# Patient Record
Sex: FEMALE | Race: WHITE
[De-identification: names, ages, dates, MRNs, and addresses within clinical notes are randomized per-mention and may not be internally consistent; named-entity substitution may affect disease eponyms.]

---

## 2020-06-24 ENCOUNTER — TRANSCRIBE ORDERS (OUTPATIENT)
Dept: OTHER | Age: 30
End: 2020-06-24

## 2020-06-24 DIAGNOSIS — H54.7 VISION LOSS: Primary | ICD-10-CM

## 2020-06-25 ENCOUNTER — TELEPHONE (OUTPATIENT)
Dept: OPHTHALMOLOGY | Facility: CLINIC | Age: 30
End: 2020-06-25

## 2020-06-25 NOTE — TELEPHONE ENCOUNTER
Spoke to pt at 1554  2 months ago left eye while working lost vision  Was seen 3 days later for exam-- no objective findings     Seen Monday and recommended to see ophthalmology         Vision worsening afterwards   Spot in central vision blurred/not able to see    Right eye starting to be affected     Foreign body sensation    Scheduled with Dr. Carrington July 7th     Offered next week and pt unable-- other specialty appt per pt     Note to Dr. Zeb Rashid, RN 4:03 PM 06/25/20          [facility]    Phone Message    May a detailed message be left on voicemail: yes     Reason for Call: Other: Pt is urgently referred to Eye Clinic by Dr Agustín Page at [facility] in [place] for a recent and sudden onset vision loss and possible electrodiagnostic testing if indicated. Referral states pt needs to be seen within 2-3 weeks. Please review and reach out to pt to discuss, thanks!     Action Taken: Message routed to:  [facility] Eye Clinic    Travel Screening: Not Applicable

## 2020-06-25 NOTE — TELEPHONE ENCOUNTER
Thank you for the note. I was able to review the note from the Optometry visit this week. Agree that we should see the patient and thank you for offering next week and agreeing on 7/7/20. We will plan to dilate and do testing in the office. If necessary, we could consider electrophysiology such as ERG, but that can be determined after the initial visit.

## 2020-07-07 ENCOUNTER — OFFICE VISIT (OUTPATIENT)
Dept: OPHTHALMOLOGY | Facility: CLINIC | Age: 30
End: 2020-07-07
Attending: OPHTHALMOLOGY
Payer: COMMERCIAL

## 2020-07-07 DIAGNOSIS — H53.8 BLURRY VISION, BILATERAL: Primary | ICD-10-CM

## 2020-07-07 DIAGNOSIS — H40.052 OCULAR HYPERTENSION, LEFT EYE: ICD-10-CM

## 2020-07-07 PROBLEM — M62.59 ATROPHY OF MUSCLE OF MULTIPLE SITES: Status: ACTIVE | Noted: 2020-06-30

## 2020-07-07 PROBLEM — R79.89 ELEVATED TESTOSTERONE LEVEL IN FEMALE: Status: ACTIVE | Noted: 2018-05-16

## 2020-07-07 PROBLEM — N91.2 AMENORRHEA: Status: ACTIVE | Noted: 2018-05-16

## 2020-07-07 PROBLEM — K58.0 IRRITABLE BOWEL SYNDROME WITH DIARRHEA: Status: ACTIVE | Noted: 2018-04-17

## 2020-07-07 PROBLEM — G62.9 NEUROPATHY: Status: ACTIVE | Noted: 2020-06-30

## 2020-07-07 PROBLEM — R53.1 WEAKNESS: Status: ACTIVE | Noted: 2020-06-30

## 2020-07-07 PROBLEM — K76.0 HEPATIC STEATOSIS: Status: ACTIVE | Noted: 2018-07-03

## 2020-07-07 PROCEDURE — 92134 CPTRZ OPH DX IMG PST SGM RTA: CPT | Mod: ZF | Performed by: OPHTHALMOLOGY

## 2020-07-07 PROCEDURE — 92133 CPTRZD OPH DX IMG PST SGM ON: CPT | Mod: ZF | Performed by: OPHTHALMOLOGY

## 2020-07-07 PROCEDURE — G0463 HOSPITAL OUTPT CLINIC VISIT: HCPCS | Mod: ZF

## 2020-07-07 PROCEDURE — 92250 FUNDUS PHOTOGRAPHY W/I&R: CPT | Mod: 59 | Performed by: OPHTHALMOLOGY

## 2020-07-07 RX ORDER — LEVOTHYROXINE SODIUM 25 UG/1
25 TABLET ORAL
COMMUNITY
Start: 2020-06-08

## 2020-07-07 RX ORDER — GABAPENTIN 100 MG/1
100 CAPSULE ORAL DAILY PRN
COMMUNITY
Start: 2020-07-03

## 2020-07-07 RX ORDER — CALCIUM CARBONATE 500 MG/1
500 TABLET, CHEWABLE ORAL DAILY PRN
COMMUNITY
Start: 2019-09-18

## 2020-07-07 RX ORDER — ALPRAZOLAM 0.5 MG
1 TABLET ORAL 3 TIMES DAILY PRN
COMMUNITY
Start: 2019-02-08

## 2020-07-07 RX ORDER — HYDROXYZINE HYDROCHLORIDE 25 MG/1
25 TABLET, FILM COATED ORAL EVERY 6 HOURS PRN
COMMUNITY
Start: 2020-06-04

## 2020-07-07 RX ORDER — FOLIC ACID 1 MG/1
1 TABLET ORAL DAILY
COMMUNITY
Start: 2020-02-15

## 2020-07-07 RX ORDER — ERGOCALCIFEROL 1.25 MG/1
1 CAPSULE, LIQUID FILLED ORAL DAILY
COMMUNITY
Start: 2019-09-24

## 2020-07-07 ASSESSMENT — REFRACTION_WEARINGRX
OD_AXIS: 088
OS_SPHERE: -7.50
OS_AXIS: 088
OS_SPHERE: -6.00
OS_CYLINDER: +3.50
SPECS_TYPE: SVL
OS_AXIS: 094
OD_AXIS: 085
OD_SPHERE: -6.25
OD_CYLINDER: +4.00
SPECS_TYPE: SVL
OS_CYLINDER: +3.75
OD_CYLINDER: +3.50
OD_SPHERE: -6.75

## 2020-07-07 ASSESSMENT — EXTERNAL EXAM - LEFT EYE: OS_EXAM: NORMAL

## 2020-07-07 ASSESSMENT — CONF VISUAL FIELD
OS_NORMAL: 1
METHOD: COUNTING FINGERS
OD_NORMAL: 1

## 2020-07-07 ASSESSMENT — VISUAL ACUITY
OD_PH_CC: 20/80
OD_CC: 20/100
CORRECTION_TYPE: GLASSES
METHOD: SNELLEN - SINGLE
OS_CC: 20/80

## 2020-07-07 ASSESSMENT — TONOMETRY
IOP_METHOD: ICARE
OS_IOP_MMHG: 25
OD_IOP_MMHG: 20

## 2020-07-07 ASSESSMENT — SLIT LAMP EXAM - LIDS
COMMENTS: NORMAL
COMMENTS: NORMAL

## 2020-07-07 ASSESSMENT — EXTERNAL EXAM - RIGHT EYE: OD_EXAM: NORMAL

## 2020-07-07 ASSESSMENT — CUP TO DISC RATIO
OS_RATIO: 0.4
OD_RATIO: 0.4

## 2020-07-07 NOTE — NURSING NOTE
"Chief Complaints and History of Present Illnesses   Patient presents with     Consult For     Chief Complaint(s) and History of Present Illness(es)     Consult For     Laterality: both eyes    Location: central vision    Quality: blurred vision    Severity: severe    Frequency: constantly    Course: gradually worsening    Associated symptoms: headache, weight loss, photophobia and floaters.  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     Vision \"is much worse\" since RALF 06/22/20.     Floaters are longstanding and unchanging.     C/o increased light sensitivity and of intermittent FB \"elizabet/gritty\" sensation. Denies dry eyes.   Recent 50lb weight loss in past 4mo.    urologist: Imtiaz HAYWARD, has f/u on Tuesday 7/14/20 for f/u     Coty Alvarenga COT 12:38 PM July 7, 2020                      "

## 2020-07-07 NOTE — LETTER
7/7/2020       RE: Kalyn Jauregui  [address] [facility] [address]     Dear Colleague,    Thank you for referring your patient, Kalyn Jauregui, to the EYE CLINIC at [facility]. Please see a copy of my visit note below.    Chief Complaint/Presenting Concern: Retinal     History of Present Illness:   Kalyn Jauregui is a 29 year old patient who presents for evaluation of vision loss over the last few months.     This happened suddenly in April 2020 with vision loss in the left eye while working. Nothing came into the eye. After a few days, the patient went to an outside optometrist. A follow-up visit did not identify any clear cause. Things seem to be worse in each eye with gray spots in the vision, although there are no clear borders/edges. This can sometimes be related to exercise with increased blurriness of the vision. An MRI of the brain was normal and after recent visits to two other eye physicians, this referral was made.    Additional Ocular History: Spectacle wear, no eye injuries or surgies    Relevant Past Medical/Family/Social History:  1. Neuropathy being evaluated including MRI of the neck. MRI of the brain and orbits was performed on 6/10/20 which were both normal (report reviewed)  2. History of bladder/kidney surgery age 8 for reflux    Here with boyfriend. No family history of eye disease.    Relevant Review of Systems: No fevers, rashes, or joint pains. No sweats at night.     Laboratory Testing: July 7, 2020: Normal CRP, ESR.   (Reviewed from 6/4/20): Normal/negative; SUNNY,  Lyme     Current eye related medications: None    Retina/Uveitis Imaging:  OCT Spectralis Macula July 7, 2020  right eye: Normal contour, no fluid, slightly thick choroid but symmetric to left eye. No NFL thickening by raster  left eye:  Normal contour, no fluid. slightly thick choroid but symmetric to right eye. No NFL thickening by raster    Optos Fundus Autofluorescence  Image (FAF) OU (both eyes)  right eye: Normal autofluorescence  left eye:Normal autofluorescence other than focal hypo-AF corresponding to pigment clump inferiorly    OCT Optic Nerve RNFL Spectralis OU (both eyes)  right eye: Avg thickness 91 microns. Borderline thin superonasal  left eye: Avg thickness 98 microns? Blink artifact of thickening temporally    Assessment:    1. Blurry vision, bilateral  Unclear etiology and symmetrically reduced in each eye but exam normal    2. Ocular hypertension, left eye  Mild elevation but healthy appearing optic nerve     Plan/Recommendations:    Discussed findings with patient and boyfriend. The cause of the vision loss is unclear at this time. Optic nerve function (color plates and pupils) normal. The RNFL scan of the optic nerves shows minimal variant in the right eye and artifact left eye. The OCT scans of the retina are normal and healthy in both eyes without abnormalities in the contour of the inner retina, outer retina, retinal pigment epithelium or thickening at the optic nerves.    We discussed that the cause of the vision loss in each eye is unclear. As exam and testing generally normal, we discussed electrophysiology testing (ERG). I explained that this would not give us a diagnosis, but may help us identify if there is focal macular (multifocal ERG) or global retinal dysfunction (Full field ERG).    Given recent normal MRI Brain and orbit, do not recommend any additional laboratory or other testing at this time. Will defer to Dr. Lance for aquaporin/MOG testing, which do not appear indicated at this time given normal recent MRI brain, although MRI neck pending.    Recommend Full Field Electroretinogram (for general global retinal function) and Multifocal Electroretinogram (for macular retinal function)    No eye specific treatments recommended at this time    RTC     1. Consultation with Neuro-Ophthalmologist, Dr. John Lance to possibly include VF testing for vision loss  of unclear etiology.     2. Electrophysiology: Full field and multifocal ERG testing on different day    3. Zeb PRN, IOP, dilate, testing TBD        Attending Physician Attestation:  Complete documentation of historical and exam elements from today's encounter can be found in the full encounter summary report (not reduplicated in this progress note). I personally obtained the chief complaint(s) and history of present illness. I confirmed and edited as necessary the review of systems, past medical/surgical history, family history, social history, and examination findings as documented by others; and I examined the patient myself. I personally reviewed the relevant tests, images, and reports as documented above. I formulated and edited as necessary the assessment and plan and discussed the findings and management plan with the patient and family.  Armando Carrington MD.    Armando Carrington M.D., Uveitis and Medical Retina, July 7, 2020     Armando Carrington MD  [facility] Dept of Ophthalmology  Uveitis and Medical Retina

## 2020-07-07 NOTE — PROGRESS NOTES
Chief Complaint/Presenting Concern: Retinal     History of Present Illness:   Kalyn Jauregui is a 29 year old patient who presents for evaluation of vision loss over the last few months.     This happened suddenly in April 2020 with vision loss in the left eye while working. Nothing came into the eye. After a few days, the patient went to an outside Optometrist. A follow-up visit did not identify any clear cause. Things seem to be worse in each eye with gray spots in the vision, although there are no clear borders/edges. This can sometimes be related to exercise with increased blurriness of the vision. An MRI of the brain was normal and after recent visits to two other Eye Physicians, this referral was made.    Additional Ocular History: Spectacle wear, no eye injuries or surgies    Relevant Past Medical/Family/Social History:  1. Neuropathy being evaluated including MRI of the neck. MRI of the brain and orbits was performed on 6/10/20 which were both normal (report reviewed)  2. History of bladder/kidney surgery age 8 for reflux    Here with Boyfriend. No family history of eye disease.    Relevant Review of Systems: No fevers, rashes, or joint pains. No sweats at night.     Laboratory Testing: July 7, 2020: Normal CRP, ESR.   (Reviewed from 6/4/20): Normal/negative; SUNNY,  Lyme     Current eye related medications: None    Retina/Uveitis Imaging:  OCT Spectralis Macula July 7, 2020  right eye: Normal contour, no fluid, slightly thick choroid but symmetric to left eye. No NFL thickening by raster  left eye:  Normal contour, no fluid. slightly thick choroid but symmetric to right eye. No NFL thickening by raster    Optos Fundus Autofluorescence Image (FAF) OU (both eyes)  right eye: Normal autofluorescence  left eye:Normal autofluorescence other than focal hypo-AF corresponding to pigment clump inferiorly    OCT Optic Nerve RNFL Spectralis OU (both eyes)  right eye: Avg thickness 91 microns. Borderline thin  superonasal  left eye: Avg thickness 98 microns? Blink artifact of thickening temporally    Assessment:    1. Blurry vision, bilateral  Unclear etiology and symmetrically reduced in each eye but exam normal    2. Ocular hypertension, left eye  Mild elevation but healthy appearing optic nerve     Plan/Recommendations:    Discussed findings with patient and Boyfriend. The cause of the vision loss is unclear at this time. Optic nerve function (color plates and pupils) normal. The RNFL scan of the optic nerves shows minimal variant in the right eye and artifact left eye. The OCT scans of the retina are normal and healthy in both eyes without abnormalities in the contour of the inner retina, outer retina, retinal pigment epithelium or thickening at the optic nerves.    We discussed that the cause of the vision loss in each eye is unclear. As exam and testing generally normal, we discussed electrophysiology testing (ERG). I explained that this would not give us a diagnosis, but may help us identify if there is focal macular (multifocal ERG) or global retinal dysfunction (Full field ERG).    Given recent normal MRI Brain and orbit, do not recommend any additional laboratory or other testing at this time. Will defer to Dr. Lance for aquaporin/MOG testing, which do not appear indicated at this time given normal recent MRI brain, although MRI neck pending.    Recommend Full Field Electroretinogram (for general global retinal function) and Multifocal Electroretinogram (for macular retinal function)    No eye specific treatments recommended at this time    RTC     1. Consultation with Neuro-Ophthalmologist, Dr. John Lance to possibly include VF testing for vision loss of unclear etiology.     2. Electrophysiology: Full field and multifocal ERG testing on different day    3. Yamanuha PRN, IOP, dilate, testing TBD    Physician Attestation     Attending Physician Attestation:  Complete documentation of historical and exam elements  from today's encounter can be found in the full encounter summary report (not reduplicated in this progress note). I personally obtained the chief complaint(s) and history of present illness. I confirmed and edited as necessary the review of systems, past medical/surgical history, family history, social history, and examination findings as documented by others; and I examined the patient myself. I personally reviewed the relevant tests, images, and reports as documented above. I formulated and edited as necessary the assessment and plan and discussed the findings and management plan with the patient and any family members present at the time of the visit.  Armando Carrington M.D., Uveitis and Medical Retina, July 7, 2020

## 2020-07-08 NOTE — TELEPHONE ENCOUNTER
FUTURE VISIT INFORMATION      FUTURE VISIT INFORMATION:    Date: 7/29/20    Time: 9:00am    Location: [facility]  REFERRAL INFORMATION:    Referring provider:  Armando Carrington MD     Referring providers clinic:  [facility]    Reason for visit/diagnosis  Blurry vision, bilateral    RECORDS REQUESTED FROM:       Clinic name Comments Records Status Imaging Status   [facility] Ov/referral 7/7 EPIC

## 2020-07-29 ENCOUNTER — PRE VISIT (OUTPATIENT)
Dept: OPHTHALMOLOGY | Facility: CLINIC | Age: 30
End: 2020-07-29

## 2021-01-04 ENCOUNTER — HEALTH MAINTENANCE LETTER (OUTPATIENT)
Age: 31
End: 2021-01-04

## 2021-10-10 ENCOUNTER — HEALTH MAINTENANCE LETTER (OUTPATIENT)
Age: 31
End: 2021-10-10

## 2022-01-30 ENCOUNTER — HEALTH MAINTENANCE LETTER (OUTPATIENT)
Age: 32
End: 2022-01-30

## 2022-02-17 PROBLEM — H53.8 BLURRY VISION, BILATERAL: Status: ACTIVE | Noted: 2020-06-30

## 2022-09-24 ENCOUNTER — HEALTH MAINTENANCE LETTER (OUTPATIENT)
Age: 32
End: 2022-09-24

## 2023-05-08 ENCOUNTER — HEALTH MAINTENANCE LETTER (OUTPATIENT)
Age: 33
End: 2023-05-08

## 2024-07-14 ENCOUNTER — HEALTH MAINTENANCE LETTER (OUTPATIENT)
Age: 34
End: 2024-07-14